# Patient Record
Sex: FEMALE | Race: WHITE
[De-identification: names, ages, dates, MRNs, and addresses within clinical notes are randomized per-mention and may not be internally consistent; named-entity substitution may affect disease eponyms.]

---

## 2019-10-29 NOTE — EKG
Test Date:    2019-10-29               Test Time:    09:32:45

Technician:   JANIA                                   

                                                     

MEASUREMENT RESULTS:                                       

Intervals:                                           

Rate:         51                                     

WY:           162                                    

QRSD:         82                                     

QT:           458                                    

QTc:          422                                    

Axis:                                                

P:            47                                     

WY:           162                                    

QRS:          26                                     

T:            26                                     

                                                     

INTERPRETIVE STATEMENTS:                                       

                                                     

Sinus bradycardia

Otherwise normal ECG

No previous ECG available for comparison



Electronically Signed On 10-29-19 10:15:54 CDT by Michael Dickson

## 2019-10-29 NOTE — RAD REPORT
EXAM DESCRIPTION:  RAD - Chest Pa And Lat (2 Views) - 10/29/2019 9:49 am

 

CLINICAL HISTORY:  pre-op, pending right shoulder surgery

 

COMPARISON:  None.

 

TECHNIQUE:  PA and lateral views of the chest were obtained.

 

FINDINGS:  The lungs are clear.   Heart size is normal and central vasculature is within normal limit
s.  No pleural effusion or pneumothorax seen.  No acute bony finding noted. Widening of the left AC j
oint is noted. No aortic abnormality.

 

IMPRESSION:  No acute cardiopulmonary process.

## 2019-11-01 ENCOUNTER — HOSPITAL ENCOUNTER (OUTPATIENT)
Dept: HOSPITAL 97 - OR | Age: 39
Discharge: HOME HEALTH | End: 2019-11-01
Attending: ORTHOPAEDIC SURGERY
Payer: COMMERCIAL

## 2019-11-01 VITALS — OXYGEN SATURATION: 100 % | SYSTOLIC BLOOD PRESSURE: 133 MMHG | DIASTOLIC BLOOD PRESSURE: 62 MMHG

## 2019-11-01 VITALS — TEMPERATURE: 97 F

## 2019-11-01 DIAGNOSIS — M75.81: ICD-10-CM

## 2019-11-01 DIAGNOSIS — M75.21: ICD-10-CM

## 2019-11-01 DIAGNOSIS — M75.41: ICD-10-CM

## 2019-11-01 DIAGNOSIS — Z82.49: ICD-10-CM

## 2019-11-01 DIAGNOSIS — M75.111: Primary | ICD-10-CM

## 2019-11-01 DIAGNOSIS — Z88.8: ICD-10-CM

## 2019-11-01 PROCEDURE — 93005 ELECTROCARDIOGRAM TRACING: CPT

## 2019-11-01 PROCEDURE — 71046 X-RAY EXAM CHEST 2 VIEWS: CPT

## 2019-11-01 PROCEDURE — 73020 X-RAY EXAM OF SHOULDER: CPT

## 2019-11-01 PROCEDURE — 29822 SHO ARTHRS SRG LMTD DBRDMT: CPT

## 2019-11-01 PROCEDURE — 0RNJ4ZZ RELEASE RIGHT SHOULDER JOINT, PERCUTANEOUS ENDOSCOPIC APPROACH: ICD-10-PCS

## 2019-11-01 NOTE — RAD REPORT
EXAM DESCRIPTION:  RAD - Shoulder 1 View - 11/1/2019 9:41 am

 

CLINICAL HISTORY:  POST RIGHT SHOULDER ARTHROSCOPIC ROTATOR CUFF DEBR

 

COMPARISON:  No comparisons

 

FINDINGS:  No bone or joint abnormality is seen. Mild soft tissue swelling is evident. Mild atelectas
is seen in the right lung base.

## 2019-11-01 NOTE — OP
Date of Procedure:  11/01/2019



Surgeon:  Stanislaw Villa MD



Preoperative Diagnoses:  

1.Right shoulder impingement syndrome.

2.Right shoulder partial rotator cuff tear.



Postoperative Diagnoses:  

1.Right shoulder impingement syndrome.

2.Right shoulder partial rotator cuff tear.



Procedure Performed:  

1.Right shoulder arthroscopic rotator cuff debridement.

2.Right shoulder arthroscopic subacromial decompression.



Anesthesia:  General endotracheal with right-sided interscalene block.



Complications:  None.



Implants:  None.



Fluids:  Per anesthesia record.



Estimated Blood Loss:  10 mL.



Indication For The Procedure:  Lorraine is a 39-year-old female who presented to my clinic with signs
 and symptoms and x-ray findings and MRI findings consistent with an impingement syndrome and partial
 rotator cuff tear.  She failed conservative treatment measures including exercise program as well as
 corticosteroid injection.  I discussed with the patient at length risks and benefits associated with
 operative and nonoperative treatment.  She expressed understanding and elected to proceed with opera
tive treatment.



Description Of Procedure:  After informed consent was obtained, the patient was identified in the pre
operative holding area.  The right upper extremity was marked.  Patient was then brought back to the 
PACU where she underwent an interscalene block to the right upper extremity performed by Anesthesia. 
 She was then taken back to the operating room, transferred to the operating table in a supine fashio
n, and placed under general endotracheal anesthesia.  She was then placed in the beach chair position
 with her extremities well padded.  The right upper extremity was then examined.  The patient had ful
l range of motion of her shoulder and no instability noted.  The right upper extremity was then prepp
ed and draped in usual sterile fashion.  A time-out was initiated.  Correct patient and procedure wer
e confirmed and identified.  The patient had received preoperative prophylactic antibiotics.  Via the
 posterior portal position, a spinal needle was introduced in the glenohumeral joint and it was injec
sophia with 30 mL of normal saline to distend the capsule.  A posterior portal was created and an arthro
scope was brought in the posterior portal position.  An anterior portal was then created under direct
 visualization and the cannula was placed.  Diagnostic arthroscopy was then performed.  Patient was n
oted to have to overall pristine cartilage in the humeral head and glenoid surface.  The anterior and
 posterior labrum were found to be stable without tear.  The superior labrum was also found to be sta
ble without tear.  The biceps anchor and biceps tendon were found to be without significant tenosynov
itis and without any fissuring or fraying, any obvious tear.  There were no loose bodies found on the
 axillary pouch.  Subscapularis was found to be intact without tear.  Anterior supraspinatus was note
d to have some mild fraying consistent with a very small partial thickness undersurface tear which wa
s debrided using arthroscopic shaver.  The arthroscope was then brought back in the subacromial space
.  A lateral portal was then created.  A subacromial bursectomy was performed using arthroscopic shav
er and radiofrequency ablator.  The undersurface of the acromion was then debrided using the radiofre
quency ablator to clean any soft tissue on the undersurface of the acromion.  It was noted that the p
atient did have some inferior acromial spurring.  Acromioplasty was performed using an arthroscopic b
ur and this was used to debride the spurring off the undersurface of the acromion and performed subac
romial decompression.  The supraspinatus tendon was then fully inspected.  There were no significant 
tearing noted on the bursal side of the supraspinatus tendon and was probed with obturator.  Again, n
o defects or voids were identified.  The arthroscopic instruments were then removed without complicat
ion and portals were approximated using a 3-0 Monocryl.  Sterile dressings were applied.  The patient
 was awakened and transferred to PACU in stable condition.



Postoperative Plan:  She will return to clinic next week for wound check.  Physical Therapy will be c
onsulted to aid with mobilization and she will follow the post decompression protocol.





MARIBELL/SHANNEN

DD:  11/01/2019 12:12:34Voice ID:  [id number]

DT:  11/01/2019 23:18:57Report ID:  [id number]

## 2019-11-01 NOTE — P.BOP
Preoperative diagnosis: right shoulder impingement syndrome, partial rotator 

cuff tear


Postoperative diagnosis: same


Primary procedure: right shoulder arthroscopic subacromial decompression


Secondary procedure: right shoulder arthroscopic rotator cuff debridement


Assistant: NONE,NONE


Estimated blood loss: <10 cc


Specimen: none


Findings: see dictation


Anesthesia: General


Complications: None


Implants: none


Fluids & blood products: per anesthesia record


Transferred to: Recovery Room


Condition: Good

## 2019-11-10 NOTE — XMS REPORT
[facility]

 Created on:2019



Patient:AMY MENDEZ

Sex:Female

:1980

External Reference #:[id number]





Demographics







 Address  [address] [place] [address]

 

 Phone  [phone]

 

 Preferred Language  en

 

 Marital Status  Unknown

 

 Confucianism Affiliation  Unknown

 

 Race  White

 

 Ethnic Group  Unknown









Author







 Organization  [employer]









Care Team Providers







 Name  Role  Phone

 

 Stanislaw Villa  Provider Role  Unavailable









Allergies

No Known Allergies



Problems







 Problem Type  Condition  Code  Onset Dates  Condition Status

 

 Problem  Tear of right rotator cuff,  M75.101    Active



   unspecified tear extent      







Medications

No Known Medications



Results

No Known Results



Summary Purpose

[employer] Submission